# Patient Record
Sex: FEMALE | Race: BLACK OR AFRICAN AMERICAN | NOT HISPANIC OR LATINO | Employment: FULL TIME | URBAN - METROPOLITAN AREA
[De-identification: names, ages, dates, MRNs, and addresses within clinical notes are randomized per-mention and may not be internally consistent; named-entity substitution may affect disease eponyms.]

---

## 2022-06-18 ENCOUNTER — HOSPITAL ENCOUNTER (EMERGENCY)
Facility: OTHER | Age: 27
Discharge: HOME | End: 2022-06-18
Attending: EMERGENCY MEDICINE
Payer: MEDICAID

## 2022-06-18 VITALS
RESPIRATION RATE: 14 BRPM | DIASTOLIC BLOOD PRESSURE: 58 MMHG | SYSTOLIC BLOOD PRESSURE: 116 MMHG | TEMPERATURE: 98 F | HEART RATE: 87 BPM | HEIGHT: 67 IN | OXYGEN SATURATION: 98 % | BODY MASS INDEX: 20.4 KG/M2 | WEIGHT: 130 LBS

## 2022-06-18 DIAGNOSIS — H00.11 CHALAZION OF RIGHT UPPER EYELID: Primary | ICD-10-CM

## 2022-06-18 PROCEDURE — 99284 EMERGENCY DEPT VISIT MOD MDM: CPT

## 2022-06-18 RX ORDER — CEPHALEXIN 500 MG/1
500 CAPSULE ORAL 4 TIMES DAILY
Qty: 20 CAPSULE | Refills: 0 | Status: SHIPPED | OUTPATIENT
Start: 2022-06-18 | End: 2022-06-23

## 2022-06-18 RX ORDER — KETOROLAC TROMETHAMINE 10 MG/1
10 TABLET, FILM COATED ORAL EVERY 6 HOURS
Qty: 12 TABLET | Refills: 0 | Status: SHIPPED | OUTPATIENT
Start: 2022-06-18 | End: 2022-06-21

## 2022-06-18 NOTE — ED PROVIDER NOTES
CHIEF COMPLAINT:   Chief Complaint   Patient presents with    eye infection     R eye X 1 week, has been seen twice, was rx'd oral and topical abx, +R periorbital pain, swelling and redness noted to R upper eyelid.  Pt reports drainage to R eye in the mornings       HISTORY OF PRESENT ILLNESS: Faith Salas who is a 27 y.o. presents to the emergency department today with complaint of right upper eyelid pain.  Patient reports that symptoms initially began approximately 3 weeks ago.  She states that she was seen in another emergency room and was told probable stye.  She is started on initial antibiotics and topical antibiotics.  She stated no significant improvement he was seen again for continued pain.  She reports antibiotic was changed to Bactrim.  She states that she completed that had some initial improvement and I lid pain and swelling.  She states that the area reoccurred over the past few days.  She denies any pain with eye movement, vision changes, eye redness, fever, chills or additional complaints.  She denies any contact lens use      REVIEW OF SYSTEMS:  Constitutional: no  fever, no chills.  Eyes: No discharge.  No eye redness, right upper eyelid pain and swelling  HENT: no nasal congestion, no  sore throat.   Cardiovascular: No chest pain, no palpitations.  Respiratory: no cough, no shortness of breath.  Gastrointestinal: no nausea, no diarrhea, No abdominal pain, no vomiting.   Genitourinary: No hematuria, dysuria, urgency.  Musculoskeletal: no  back pain, no body aches.  Skin: No rashes, no lesions.  Neurological: no headache, no focal weakness.    Otherwise remaining ROS negative     ALLERGIES REVIEWED  MEDICATIONS REVIEWED  PMH/PSH/SOC/FH REVIEWED     The history is provided by the patient.    Nursing/Ancillary staff note reviewed.        PHYSICAL EXAM:  VS reviewed  Vitals:    06/18/22 1105   BP: (!) 116/58   Pulse: 87   Resp: 14   Temp: 97.8 °F (36.6 °C)       General Appearance: The patient is  alert, has no immediate or signs of toxicity. No acute distress.    HEENT: Eyes:  With no injection, No drainage.  EOMs intact, PERRL.  Tender small hole area of erythema and edema to the right upper lid.  No obvious stye.  Due to pain unable to flip eyelid.  No extending erythema to the preseptal or infraorbital regions.  Neck:Neck is with No stridor.   Respiratory: There are no retractions.   Cardiovascular: Regular rate   Gastrointestinal:  Abdomen is without distention.   Neurological: Alert and oriented x 4. No focal weakness.  Skin: Warm and dry, no rashes.  Musculoskeletal: Extremities are non-swollen and have full range of motion.      Past Medical History:   Diagnosis Date    Asthma          History reviewed. No pertinent surgical history.      ED COURSE:     No results found for this or any previous visit.  Imaging Results    None         Patient presenting with right upper eyelid pain.  She appears well in no significant distress.  HEENT exam reveals ith no injection, No drainage.  EOMs intact, PERRL.  Tender small hole area of erythema and edema to the right upper lid.  No obvious stye.  Due to pain unable to flip eyelid.  No extending erythema to the preseptal or infraorbital regions.    Differential Diagnosis includes, but is not limited to:  Acute glaucoma, open globe, ocular foreign body, retinal detachment, vitreous hemorrhage, endophthalmitis, traumatic injury, orbital fracture, corneal abrasion/ulcer, retinal vascular occlusion, optic neuritis, periorbital/orbital cellulitis, hyphema, hypopion, iritis, UV keratitis, subconjunctival hemorrhage, conjunctivitis, blepharitis, chalazion/hordeolum, benign vitreous floaters.      ED management:  Discussed overall impression is consistent with chalazion although we did discuss she does appear to have some pain and could be related to were superior clogged duct.  No findings to suggest periorbital cellulitis a retro orbital involvement at this time.   Discuss need for warm compress initiation of antibiotics and close follow-up with Ophthalmology as she may need potential excision of area. Patient seen for a Vital signs did not indicate sepsis and patient was welling appearing, okay for discharge home.      IMPRESSION  The encounter diagnosis was Chalazion of right upper eyelid. Strict instructions to follow up with primary care physician or reference provided for further assessment and evaluation. Given instructions to return for any acute symptoms and verbalized understanding of this medical plan.      Please pardon typos or dictation errors, as this note was transcribed using M*Modal fluency direct dictation software.                                ELDA Bartholomew  06/18/22 [id number]

## 2022-06-18 NOTE — ED TRIAGE NOTES
Pt reports to ED with c/o R eye infection. Pt states that she was seen at another ED around 1 week ago for redness, swelling, and pain to R eye. Pt states that she was given abx ointment which she completed but has not had resolution of symptoms.

## 2022-06-18 NOTE — Clinical Note
"Faith"Rios Salas was seen and treated in our emergency department on 6/18/2022.  She may return to work on 06/20/2022.       If you have any questions or concerns, please don't hesitate to call.      ELDA Bartholomew"

## 2022-06-20 ENCOUNTER — TELEPHONE (OUTPATIENT)
Dept: ADMINISTRATIVE | Facility: OTHER | Age: 27
End: 2022-06-20
Payer: MEDICAID